# Patient Record
Sex: MALE | Race: WHITE | NOT HISPANIC OR LATINO | Employment: UNEMPLOYED | URBAN - METROPOLITAN AREA
[De-identification: names, ages, dates, MRNs, and addresses within clinical notes are randomized per-mention and may not be internally consistent; named-entity substitution may affect disease eponyms.]

---

## 2022-02-05 ENCOUNTER — OFFICE VISIT (OUTPATIENT)
Dept: URGENT CARE | Facility: CLINIC | Age: 42
End: 2022-02-05
Payer: COMMERCIAL

## 2022-02-05 VITALS
HEIGHT: 74 IN | SYSTOLIC BLOOD PRESSURE: 137 MMHG | BODY MASS INDEX: 28.23 KG/M2 | WEIGHT: 220 LBS | RESPIRATION RATE: 18 BRPM | TEMPERATURE: 97 F | HEART RATE: 96 BPM | DIASTOLIC BLOOD PRESSURE: 86 MMHG | OXYGEN SATURATION: 98 %

## 2022-02-05 DIAGNOSIS — H93.13 TINNITUS OF BOTH EARS: ICD-10-CM

## 2022-02-05 DIAGNOSIS — R42 VERTIGO: Primary | ICD-10-CM

## 2022-02-05 PROCEDURE — G0382 LEV 3 HOSP TYPE B ED VISIT: HCPCS | Performed by: EMERGENCY MEDICINE

## 2022-02-05 RX ORDER — MECLIZINE HYDROCHLORIDE 25 MG/1
25 TABLET ORAL EVERY 8 HOURS SCHEDULED
Qty: 30 TABLET | Refills: 0 | Status: SHIPPED | OUTPATIENT
Start: 2022-02-05

## 2022-02-05 RX ORDER — AMOXICILLIN AND CLAVULANATE POTASSIUM 875; 125 MG/1; MG/1
TABLET, FILM COATED ORAL
COMMUNITY
Start: 2022-02-02

## 2022-02-05 NOTE — PROGRESS NOTES
[address]        NAME: Chetan Fu is a 39 y o  male  : 1980    MRN: [id number]  DATE: 2022  TIME: 9:09 AM    Assessment and Plan   Vertigo [R42]  1  Vertigo  meclizine (ANTIVERT) 25 mg tablet    Ambulatory Referral to Otolaryngology   2  Tinnitus of both ears  meclizine (ANTIVERT) 25 mg tablet    Ambulatory Referral to Otolaryngology         Patient Instructions     Patient Instructions   Vertigo   WHAT YOU NEED TO KNOW:   Vertigo is a condition that causes you to feel dizzy  You may feel that you or everything around you is moving or spinning  You may also feel like you are being pulled down or toward your side  DISCHARGE INSTRUCTIONS:   Return to the emergency department if:   · You have a headache and a stiff neck  · You have shaking chills and a fever  · You vomit over and over with no relief  · You have blood, pus, or fluid coming out of your ears  · You are confused  Contact your healthcare provider if:   · Your symptoms do not get better with treatment  · You have questions about your condition or care  Medicines:   · Medicine  may be given to help relieve your symptoms  · Take your medicine as directed  Contact your healthcare provider if you think your medicine is not helping or if you have side effects  Tell him or her if you are allergic to any medicine  Keep a list of the medicines, vitamins, and herbs you take  Include the amounts, and when and why you take them  Bring the list or the pill bottles to follow-up visits  Carry your medicine list with you in case of an emergency  Manage your symptoms:   · Do not drive , walk without help, or operate heavy machinery when you are dizzy  · Move slowly  when you move from one position to another position  Get up slowly from sitting or lying down  Sit or lie down right away if you feel dizzy  · Drink plenty of liquids  Liquids help prevent dehydration   Ask how much liquid to drink each day and which liquids are best for you  · Vestibular and balance rehabilitation therapy (VBRT)  is used to teach you exercises to improve your balance and strength  These exercises may help decrease your vertigo and improve your balance  Ask for more information about this therapy  Follow up with your doctor as directed:  Write down your questions so you remember to ask them during your visits  © Copyright [employer] 2021 Information is for End User's use only and may not be sold, redistributed or otherwise used for commercial purposes  All illustrations and images included in CareNotes® are the copyrighted property of A D A M , Inc  or [facility] Yang Ideabovenasra   The above information is an  only  It is not intended as medical advice for individual conditions or treatments  Talk to your doctor, nurse or pharmacist before following any medical regimen to see if it is safe and effective for you  Tinnitus   WHAT YOU NEED TO KNOW:   Tinnitus is when you hear ringing, clicking, buzzing, or hissing in one or both ears  You may also hear whistling, chirping, or pulsing  It may be soft or loud, and at a low or high pitch  Tinnitus that lasts for longer than 6 months is considered chronic  DISCHARGE INSTRUCTIONS:   Call 911 if:   · You feel like hurting yourself or others because of the constant noise  Contact your healthcare provider if:   · You have headaches  · You are tired and have trouble concentrating or remembering things  · You have more anxiety or stress than usual     · You have deep sadness or depression  · You have trouble falling asleep or staying asleep  · Your symptoms do not go away or they get worse  · You have questions or concerns about your condition or care  Manage tinnitus:   · Counseling  can help you learn ways to relax, decrease stress, and make your tinnitus less noticeable  · Cognitive behavioral therapy  helps you understand your condition   Your therapist will help you learn to cope with tinnitus  You may also learn new ways to relax and retrain your behavior to decrease your symptoms  · Sound therapy, such as white noise machines, may help cover your tinnitus with a pleasant sound  Sound therapy devices can help you fall asleep or help you relax  These devices can be worn in your ear or placed next to your bed at night  · Hearing aids or cochlear implants  may help if you have hearing loss  · Do not smoke  Nicotine decreases blood flow to your ear and can make your tinnitus worse  Do not use e-cigarettes or smokeless tobacco in place of cigarettes or to help you quit  They still contain nicotine  Ask your healthcare provider for information if you currently smoke and need help quitting  · Decrease how much alcohol and caffeine you drink  Alcohol and caffeine can make your tinnitus worse  Prevent tinnitus:   · Avoid exposure to loud noise, such as loud music or power tools  Occasional exposure can still cause tinnitus  Move away from the noise or turn down the volume  · Wear ear protection  when you are exposed to loud noises  Good ear protection includes ear plugs or headphones that reduce noise  Follow up with your healthcare provider in 1 to 2 months:  Your healthcare provider may refer you to an otolaryngologist, audiologist, or neurologist  Steve Newell may need to return for regular follow-up visits  Write your questions down so you remember to ask them during your visits  © Copyright [employer] 2021 Information is for End User's use only and may not be sold, redistributed or otherwise used for commercial purposes  All illustrations and images included in CareNotes® are the copyrighted property of A D A M , Inc  or [facility] Yang Smith   The above information is an  only  It is not intended as medical advice for individual conditions or treatments   Talk to your doctor, nurse or pharmacist before following any medical regimen to see if it is safe and effective for you  Follow up with PCP in 3-5 days  Proceed to  ER if symptoms worsen  Chief Complaint     Chief Complaint   Patient presents with    Tinnitus     Started this morning very loud ringing in left ear and muffled hearing pt states voices sound robotic  Pt is also very dizzy and lightheaded  History of Present Illness       Patient complains of ringing in both ears associated with vertigo and nausea since this morning  Patient has a history of chronic perforation of right ear drum for which she has seen an ENT doctors but [place]  He recently completed a course of Augmentin for sinusitis  Review of Systems   Review of Systems   Constitutional: Negative for chills and fever  HENT: Positive for tinnitus  Negative for congestion, ear discharge, hearing loss, rhinorrhea and sinus pressure  Respiratory: Negative for cough, chest tightness, shortness of breath and wheezing  Gastrointestinal: Negative for diarrhea and vomiting  Musculoskeletal: Negative for neck stiffness  Skin: Negative for pallor  Neurological: Positive for dizziness  Negative for headaches           Current Medications       Current Outpatient Medications:     amoxicillin-clavulanate (AUGMENTIN) 875-125 mg per tablet, TAKE 1 TAB(S) ORALLY EVERY 12 HOURS X 10 DAYS, Disp: , Rfl:     meclizine (ANTIVERT) 25 mg tablet, Take 1 tablet (25 mg total) by mouth every 8 (eight) hours, Disp: 30 tablet, Rfl: 0    Current Allergies     Allergies as of 02/05/2022 - Reviewed 02/05/2022   Allergen Reaction Noted    Jean [cefaclor]  01/12/2016            The following portions of the patient's history were reviewed and updated as appropriate: allergies, current medications, past family history, past medical history, past social history, past surgical history and problem list      Past Medical History:   Diagnosis Date    Seizures ([facility] [address] ) Past Surgical History:   Procedure Laterality Date    EAR SURGERY      Repaired ear drum left ear     ROTATOR CUFF REPAIR      TYMPANOSTOMY TUBE PLACEMENT         History reviewed  No pertinent family history  Medications have been verified  Objective   /86   Pulse 96   Temp (!) 97 °F (36 1 °C)   Resp 18   Ht 6' 2" (1 88 m)   Wt 99 8 kg (220 lb)   SpO2 98%   BMI 28 25 kg/m²        Physical Exam     Physical Exam  Vitals and nursing note reviewed  Constitutional:       General: He is not in acute distress  Appearance: He is well-developed  He is not diaphoretic  HENT:      Head: Normocephalic and atraumatic  Comments: Scarring of left TM, central perforation of right TM that appears chronic     Nose: Mucosal edema present  No rhinorrhea  Mouth/Throat:      Pharynx: No posterior oropharyngeal erythema  Eyes:      Conjunctiva/sclera: Conjunctivae normal       Pupils: Pupils are equal, round, and reactive to light  Cardiovascular:      Rate and Rhythm: Normal rate and regular rhythm  Pulmonary:      Effort: Pulmonary effort is normal  No respiratory distress  Breath sounds: Normal breath sounds  Musculoskeletal:      Cervical back: Neck supple  Lymphadenopathy:      Cervical: No cervical adenopathy  Skin:     General: Skin is warm and dry  Coloration: Skin is not pale  Neurological:      Mental Status: He is alert and oriented to person, place, and time  Psychiatric:         Mood and Affect: Mood normal          Behavior: Behavior normal          Thought Content:  Thought content normal          Judgment: Judgment normal

## 2022-02-09 ENCOUNTER — OFFICE VISIT (OUTPATIENT)
Dept: PHYSICAL THERAPY | Facility: CLINIC | Age: 42
End: 2022-02-09
Payer: COMMERCIAL

## 2022-02-09 DIAGNOSIS — R42 DIZZINESS AND GIDDINESS: Primary | ICD-10-CM

## 2022-02-09 DIAGNOSIS — R42 VERTIGO: ICD-10-CM

## 2022-02-09 PROCEDURE — 97162 PT EVAL MOD COMPLEX 30 MIN: CPT | Performed by: PHYSICAL THERAPIST

## 2022-02-09 NOTE — PROGRESS NOTES
PT Evaluation  and PT Discharge    Today's date: 2022  Patient name: Quyen Santos  : 1980  MRN: [id number]  Referring provider: Freada Lennox, MELA  Dx:   Encounter Diagnosis     ICD-10-CM    1  Dizziness and giddiness  R42    2  Vertigo  R42                   Assessment  Assessment details: Quyen Santos is a pleasant 39 y o  male presenting to PT via [facility] with cc of dizziness and aural fullness for past 5 days  He notes sensation of tinnitus and hearing changes in L ear insidiously  He visited [facility] and received antibiotics and meclizine and was advised to f/u with PT if dizziness didn't improve  He notes that this morning his dizziness is better but aural fullness and auditory changes remain  Upon examination, patient is not a candidate for PT as impairments appear outside scope of PT  Will refer to ENT  Plan  Plan details: Will refer to ENT due to ss of infectious process  Pt  Can f/u after if experiencing ongoing dizziness  Subjective Evaluation    History of Present Illness  Mechanism of injury: Quyen Santos presents to PT with cc of improving dizziness symptoms associated with L ear aural fullness and tinnitus  He describes a steady worsening of a full fulling as well as sensntivity to noises followed by slowed decrease in auditory input  He visited [facility] and was told to see PT if dizziness doesn't improve  He states dizziness seemed to get better since making appointment and his primary issue is now the ringing in his ear  Treatments  Current treatment: medication  Patient Goals  Patient goals for therapy: improved balance          Objective     Concurrent Complaints  Positive for tinnitus, hearing loss, aural fullness and poor concentration  Negative for nausea/motion sickness, visual change, memory loss and peripheral neuropathy  Neuro Exam:     Exacerbating factors  Positive for bending over, rolling in bed and supine to/from sitting     Negative for looking up, walking, turning head (speed dependent), optokinetic movement and walking in busy environment  Positional testing   Placido-Hallpike   Left posterior canal: WNL  Right posterior canal: WNL  Roll test   Right horizontal canal: WNL  Positional testing comment: CORAZON testing shows reduced ability to balance on SLS and TS with EC    Cranial Nerve Screen:     CN II: The patient's visual acuity and visual fields are normal   CN III, IV, VI: The patient's pupils are equally round and reactive to light and ocular movements are normal   CN V: The patient has normal facial sensation  CN VII:  The patient has symmetric facial movement  CN VIII:  The patient's hearing is not normal *reduced input on Left noted  CN IX, X: The patient has symmetric palate movement and normal gag reflex  CN XI: The patient's shoulder shrug strength is normal   CN XII: The patient's tongue is midline without atrophy or fasciculations                   Precautions: Not PT candidate     Daily Treatment Diary:      Initial Evaluation Date: 02/09/22  Compliance 2/9                     Visit Number                     Re-Eval  IE                 [facility]   Foto Captured                            2/9                     Manual                      DC                                                                  Ther-Ex                                                                                                                                                                                                                                                  Neuro Re-Ed                                                                                                Ther-Act                                                               Modalities

## 2022-05-25 ENCOUNTER — OFFICE VISIT (OUTPATIENT)
Dept: URGENT CARE | Facility: CLINIC | Age: 42
End: 2022-05-25
Payer: COMMERCIAL

## 2022-05-25 VITALS
HEIGHT: 73 IN | DIASTOLIC BLOOD PRESSURE: 69 MMHG | BODY MASS INDEX: 29.16 KG/M2 | WEIGHT: 220 LBS | HEART RATE: 119 BPM | TEMPERATURE: 101.3 F | SYSTOLIC BLOOD PRESSURE: 130 MMHG | OXYGEN SATURATION: 97 %

## 2022-05-25 DIAGNOSIS — R35.0 FREQUENCY OF MICTURITION: Primary | ICD-10-CM

## 2022-05-25 LAB
SL AMB  POCT GLUCOSE, UA: NEGATIVE
SL AMB LEUKOCYTE ESTERASE,UA: ABNORMAL
SL AMB POCT BILIRUBIN,UA: NEGATIVE
SL AMB POCT BLOOD,UA: NEGATIVE
SL AMB POCT CLARITY,UA: ABNORMAL
SL AMB POCT COLOR,UA: YELLOW
SL AMB POCT KETONES,UA: ABNORMAL
SL AMB POCT NITRITE,UA: NEGATIVE
SL AMB POCT PH,UA: 6
SL AMB POCT SPECIFIC GRAVITY,UA: 1.01
SL AMB POCT URINE PROTEIN: ABNORMAL
SL AMB POCT UROBILINOGEN: NORMAL

## 2022-05-25 PROCEDURE — G0382 LEV 3 HOSP TYPE B ED VISIT: HCPCS | Performed by: PHYSICIAN ASSISTANT

## 2022-05-25 PROCEDURE — 87086 URINE CULTURE/COLONY COUNT: CPT | Performed by: PHYSICIAN ASSISTANT

## 2022-05-25 PROCEDURE — 81002 URINALYSIS NONAUTO W/O SCOPE: CPT | Performed by: PHYSICIAN ASSISTANT

## 2022-05-25 RX ORDER — CIPROFLOXACIN 500 MG/1
500 TABLET, FILM COATED ORAL EVERY 12 HOURS SCHEDULED
Qty: 14 TABLET | Refills: 0 | Status: SHIPPED | OUTPATIENT
Start: 2022-05-25 | End: 2022-05-27 | Stop reason: SDUPTHER

## 2022-05-25 RX ORDER — DIPHENOXYLATE HYDROCHLORIDE AND ATROPINE SULFATE 2.5; .025 MG/1; MG/1
1 TABLET ORAL EVERY MORNING
COMMUNITY

## 2022-05-25 NOTE — PATIENT INSTRUCTIONS
PC to patient to discuss adding Actos to DM medication. Message left on VM to call back.    Urinary Tract Infection in Men   WHAT YOU NEED TO KNOW:   A urinary tract infection (UTI) is caused by bacteria that get inside your urinary tract  Most bacteria that enter your urinary tract come out when you urinate  If the bacteria stay in your urinary tract, you may get an infection  Your urinary tract includes your kidneys, ureters, bladder, and urethra  Urine is made in your kidneys, and it flows from the ureters to the bladder  Urine leaves the bladder through the urethra  A UTI is more common in your lower urinary tract, which includes your bladder and urethra  DISCHARGE INSTRUCTIONS:   Return to the emergency department if:   You are urinating very little or not at all  You have a high fever with shaking chills  You have side or back pain that gets worse  Contact your healthcare provider if:   You have a mild fever  You do not feel better after 2 days of taking antibiotics  You are vomiting  You have new symptoms, such as blood or pus in your urine  You have questions or concerns about your condition or care  Medicines:   Antibiotics  help fight a bacterial infection  Medicines  may be given to decrease pain and burning when you urinate  They will also help decrease the feeling that you need to urinate often  These medicines will make your urine orange or red  Take your medicine as directed  Contact your healthcare provider if you think your medicine is not helping or if you have side effects  Tell him of her if you are allergic to any medicine  Keep a list of the medicines, vitamins, and herbs you take  Include the amounts, and when and why you take them  Bring the list or the pill bottles to follow-up visits  Carry your medicine list with you in case of an emergency  Prevent another UTI:   Empty your bladder often  Urinate and empty your bladder as soon as you feel the need  Do not hold your urine for long periods of time  Drink liquids as directed    Ask how much liquid to drink each day and which liquids are best for you  You may need to drink more liquids than usual to help flush out the bacteria  Do not drink alcohol, caffeine, or citrus juices  These can irritate your bladder and increase your symptoms  Your healthcare provider may recommend cranberry juice to help prevent a UTI  Urinate after you have sex  This can help flush out bacteria passed during sex  Do pelvic muscle exercises often  Pelvic muscle exercises may help you start and stop urinating  Strong pelvic muscles may help you empty your bladder easier  Squeeze these muscles tightly for 5 seconds like you are trying to hold back urine  Then relax for 5 seconds  Gradually work up to squeezing for 10 seconds  Do 3 sets of 15 repetitions a day, or as directed  Follow up with your doctor as directed:  Write down your questions so you remember to ask them during your visits  © Copyright [employer] 2022 Information is for End User's use only and may not be sold, redistributed or otherwise used for commercial purposes  All illustrations and images included in CareNotes® are the copyrighted property of A [employer] M , Inc  or [facility] Yang Smith   The above information is an  only  It is not intended as medical advice for individual conditions or treatments  Talk to your doctor, nurse or pharmacist before following any medical regimen to see if it is safe and effective for you

## 2022-05-27 DIAGNOSIS — N41.0 ACUTE PROSTATITIS: Primary | ICD-10-CM

## 2022-05-27 DIAGNOSIS — R35.0 FREQUENCY OF MICTURITION: ICD-10-CM

## 2022-05-27 LAB — BACTERIA UR CULT: NORMAL

## 2022-05-27 RX ORDER — CIPROFLOXACIN 500 MG/1
500 TABLET, FILM COATED ORAL EVERY 12 HOURS SCHEDULED
Qty: 14 TABLET | Refills: 0 | Status: SHIPPED | OUTPATIENT
Start: 2022-05-27 | End: 2022-06-03

## 2022-05-27 NOTE — PROGRESS NOTES
[address]        NAME: Hammad Sena is a 39 y o  male  : 1980    MRN: [id number]  DATE: May 25, 2022  TIME: 6:39 PM    Assessment and Plan   Frequency of micturition [R35 0]  1  Frequency of micturition  POCT urine dip    Urine culture    DISCONTINUED: ciprofloxacin (CIPRO) 500 mg tablet         Patient Instructions     Patient has fever and recent onset of urinary frequency after having intercourse with his wife in the hot tub  Urine dip shows moderate leukocytes and trace protein  Will send out urine culture to further evaluate and start him in the interim on Cipro  Recommended increased hydration, rest, close observation  Follow up with PCP in 3-5 days  Proceed to  ER if symptoms worsen  Chief Complaint     Chief Complaint   Patient presents with    Possible UTI     C/o fever, frequent urination, "bladder sore"onset today         History of Present Illness       Patient presents with onset today of fever, urinary frequency, soreness in his bladder  He reports that this was proceeded by him and his wife having sexual intercourse and there hot tub  He denies any discharge, burning during urination, flank pain, nausea or vomiting, hematuria  He denies previous history of UTI, STDs, or prostatitis  Review of Systems   Review of Systems   Constitutional: Positive for fever  Respiratory: Negative  Cardiovascular: Negative  Gastrointestinal: Negative  Genitourinary: Positive for frequency  Negative for difficulty urinating, dysuria, hematuria and penile discharge          Bladder discomfort         Current Medications       Current Outpatient Medications:     meclizine (ANTIVERT) 25 mg tablet, Take 1 tablet (25 mg total) by mouth every 8 (eight) hours, Disp: 30 tablet, Rfl: 0    amoxicillin-clavulanate (AUGMENTIN) 875-125 mg per tablet, TAKE 1 TAB(S) ORALLY EVERY 12 HOURS X 10 DAYS (Patient not taking: Reported on 2022), Disp: , Rfl:     ciprofloxacin (CIPRO) 500 mg tablet, Take 1 tablet (500 mg total) by mouth every 12 (twelve) hours for 7 days, Disp: 14 tablet, Rfl: 0    multivitamin (THERAGRAN) TABS, Take 1 tablet by mouth every morning, Disp: , Rfl:     Current Allergies     Allergies as of 05/25/2022 - Reviewed 05/25/2022   Allergen Reaction Noted    Ceclor [cefaclor]  01/12/2016            The following portions of the patient's history were reviewed and updated as appropriate: allergies, current medications, past family history, past medical history, past social history, past surgical history and problem list      Past Medical History:   Diagnosis Date    Depression     PTSD (post-traumatic stress disorder)     Seizures ([address] )        Past Surgical History:   Procedure Laterality Date    ADENOIDECTOMY      EAR SURGERY      Repaired ear drum left ear     ROTATOR CUFF REPAIR      TONSILLECTOMY      TYMPANOSTOMY TUBE PLACEMENT         History reviewed  No pertinent family history  Medications have been verified  Objective   /69   Pulse (!) 119   Temp (!) 101 3 °F (38 5 °C)   Ht 6' 1" (1 854 m)   Wt 99 8 kg (220 lb)   SpO2 97%   BMI 29 03 kg/m²   No LMP for male patient  Physical Exam     Physical Exam  Vitals reviewed  Constitutional:       General: He is not in acute distress  Appearance: He is well-developed  HENT:      Mouth/Throat:      Mouth: Mucous membranes are moist       Pharynx: Oropharynx is clear  Cardiovascular:      Rate and Rhythm: Normal rate and regular rhythm  Heart sounds: Normal heart sounds  No murmur heard  Pulmonary:      Effort: Pulmonary effort is normal  No respiratory distress  Breath sounds: Normal breath sounds  Abdominal:      Tenderness: There is no right CVA tenderness or left CVA tenderness  Neurological:      Mental Status: He is alert and oriented to person, place, and time

## 2023-05-07 ENCOUNTER — OFFICE VISIT (OUTPATIENT)
Dept: URGENT CARE | Facility: CLINIC | Age: 43
End: 2023-05-07

## 2023-05-07 VITALS
HEIGHT: 74 IN | SYSTOLIC BLOOD PRESSURE: 127 MMHG | WEIGHT: 197 LBS | DIASTOLIC BLOOD PRESSURE: 80 MMHG | HEART RATE: 92 BPM | TEMPERATURE: 98.2 F | RESPIRATION RATE: 18 BRPM | OXYGEN SATURATION: 100 % | BODY MASS INDEX: 25.28 KG/M2

## 2023-05-07 DIAGNOSIS — H60.501 ACUTE OTITIS EXTERNA OF RIGHT EAR, UNSPECIFIED TYPE: ICD-10-CM

## 2023-05-07 DIAGNOSIS — H72.91 PERFORATION OF RIGHT TYMPANIC MEMBRANE: Primary | ICD-10-CM

## 2023-05-07 RX ORDER — OFLOXACIN 3 MG/ML
10 SOLUTION AURICULAR (OTIC) DAILY
Qty: 10 ML | Refills: 0 | Status: SHIPPED | OUTPATIENT
Start: 2023-05-07 | End: 2023-05-07

## 2023-05-07 RX ORDER — OFLOXACIN 3 MG/ML
10 SOLUTION AURICULAR (OTIC) DAILY
Qty: 10 ML | Refills: 0 | Status: SHIPPED | OUTPATIENT
Start: 2023-05-07

## 2023-05-07 NOTE — PATIENT INSTRUCTIONS
Use Ofloxacin as prescribed  Avoid Q-Tip use  Tylenol/Ibuprofen for discomfort  Fluids  Change pillowcase daily  Follow up with PCP in 3-5 days    Proceed to ER if symptoms worsen

## 2023-05-07 NOTE — PROGRESS NOTES
[facility] [employer] [facility]        NAME: Thomas Taylor is a 43 y o  male  : 1980    MRN: [id number]  DATE: May 7, 2023  TIME: 10:26 AM    Assessment and Plan   Perforation of right tympanic membrane [H72 91]  1  Perforation of right tympanic membrane  Ambulatory Referral to Otolaryngology      2  Acute otitis externa of right ear, unspecified type  Ambulatory Referral to Otolaryngology    ofloxacin (FLOXIN) 0 3 % otic solution    DISCONTINUED: ofloxacin (FLOXIN) 0 3 % otic solution            Patient Instructions     Use Ofloxacin as prescribed  Avoid Q-Tip use  Tylenol/Ibuprofen for discomfort  Fluids  Change pillowcase daily  Follow up with PCP in 3-5 days  Proceed to ER if symptoms worsen    Chief Complaint     Chief Complaint   Patient presents with   • Earache     Pt reports swimming in a hot tub lastnight and got water in his R ear, c/o pain in the R ear since lastnight         History of Present Illness       Patient is a 44 yo male with significant PMH of chronic right TM perf presenting in the clinic today for ear pain x 1 day  Patient states he was in a hot tub yesterday when water was accidentally splashed into his right ear  Admits right ear tinnitus, right ear pain, and decrease in hearing of the right ear  Denies ear discharge, fever, chills, visual changes, headache, chest pain, and SOB  Denies the use of OTC treatment for symptom management  Patient notes he is new to the area and has not been closely followed by an ENT  Review of Systems   Review of Systems   Constitutional: Negative for chills and fever  HENT: Positive for ear pain, hearing loss and tinnitus  Negative for congestion, ear discharge and rhinorrhea  Eyes: Negative for visual disturbance  Respiratory: Negative for shortness of breath  Cardiovascular: Negative for chest pain  Skin: Negative for rash and wound  Neurological: Negative for dizziness, light-headedness and headaches           Current Medications "      Current Outpatient Medications:   •  meclizine (ANTIVERT) 25 mg tablet, Take 1 tablet (25 mg total) by mouth every 8 (eight) hours, Disp: 30 tablet, Rfl: 0  •  ofloxacin (FLOXIN) 0 3 % otic solution, Administer 10 drops to the right ear daily, Disp: 10 mL, Rfl: 0  •  amoxicillin-clavulanate (AUGMENTIN) 875-125 mg per tablet, TAKE 1 TAB(S) ORALLY EVERY 12 HOURS X 10 DAYS (Patient not taking: Reported on 5/25/2022), Disp: , Rfl:   •  multivitamin (THERAGRAN) TABS, Take 1 tablet by mouth every morning (Patient not taking: Reported on 5/7/2023), Disp: , Rfl:     Current Allergies     Allergies as of 05/07/2023 - Reviewed 05/07/2023   Allergen Reaction Noted   • Ceclor [cefaclor]  01/12/2016            The following portions of the patient's history were reviewed and updated as appropriate: allergies, current medications, past family history, past medical history, past social history, past surgical history and problem list      Past Medical History:   Diagnosis Date   • Depression    • PTSD (post-traumatic stress disorder)    • Seizures ([address] )        Past Surgical History:   Procedure Laterality Date   • ADENOIDECTOMY     • EAR SURGERY      Repaired ear drum left ear    • ROTATOR CUFF REPAIR     • TONSILLECTOMY     • TYMPANOSTOMY TUBE PLACEMENT         History reviewed  No pertinent family history  Medications have been verified  Objective   /80   Pulse 92   Temp 98 2 °F (36 8 °C)   Resp 18   Ht 6' 2\" (1 88 m)   Wt 89 4 kg (197 lb)   SpO2 100%   BMI 25 29 kg/m²        Physical Exam     Physical Exam  Vitals reviewed  Constitutional:       General: He is not in acute distress  Appearance: Normal appearance  He is normal weight  He is not ill-appearing  HENT:      Head: Normocephalic  Right Ear: Tenderness present  No drainage  There is no impacted cerumen  Tympanic membrane is perforated        Left Ear: Tympanic membrane, ear canal and external ear normal  There is no impacted " cerumen  Nose: Nose normal       Mouth/Throat:      Mouth: Mucous membranes are moist    Eyes:      General:         Right eye: No discharge  Left eye: No discharge  Extraocular Movements: Extraocular movements intact  Conjunctiva/sclera: Conjunctivae normal       Pupils: Pupils are equal, round, and reactive to light  Cardiovascular:      Rate and Rhythm: Normal rate and regular rhythm  Pulses: Normal pulses  Heart sounds: Normal heart sounds  No murmur heard  No friction rub  No gallop  Pulmonary:      Effort: Pulmonary effort is normal       Breath sounds: Normal breath sounds  No wheezing, rhonchi or rales  Skin:     General: Skin is warm  Neurological:      Mental Status: He is alert     Psychiatric:         Mood and Affect: Mood normal          Behavior: Behavior normal

## 2023-05-12 ENCOUNTER — HOSPITAL ENCOUNTER (EMERGENCY)
Facility: HOSPITAL | Age: 43
Discharge: HOME | End: 2023-05-12
Attending: EMERGENCY MEDICINE | Admitting: EMERGENCY MEDICINE

## 2023-05-12 VITALS
OXYGEN SATURATION: 99 % | BODY MASS INDEX: 25.15 KG/M2 | TEMPERATURE: 98.9 F | RESPIRATION RATE: 18 BRPM | HEART RATE: 99 BPM | HEIGHT: 74 IN | DIASTOLIC BLOOD PRESSURE: 86 MMHG | SYSTOLIC BLOOD PRESSURE: 138 MMHG | WEIGHT: 196 LBS

## 2023-05-12 DIAGNOSIS — L73.9 FOLLICULITIS: Primary | ICD-10-CM

## 2023-05-12 RX ORDER — SULFAMETHOXAZOLE AND TRIMETHOPRIM 800; 160 MG/1; MG/1
1 TABLET ORAL 2 TIMES DAILY
Qty: 20 TABLET | Refills: 0 | Status: SHIPPED | OUTPATIENT
Start: 2023-05-12 | End: 2023-05-22

## 2023-05-12 RX ORDER — AMOXICILLIN AND CLAVULANATE POTASSIUM 875; 125 MG/1; MG/1
1 TABLET, FILM COATED ORAL ONCE
Status: COMPLETED | OUTPATIENT
Start: 2023-05-12 | End: 2023-05-12

## 2023-05-12 RX ORDER — SULFAMETHOXAZOLE AND TRIMETHOPRIM 800; 160 MG/1; MG/1
1 TABLET ORAL ONCE
Status: COMPLETED | OUTPATIENT
Start: 2023-05-12 | End: 2023-05-12

## 2023-05-12 RX ORDER — AMOXICILLIN AND CLAVULANATE POTASSIUM 875; 125 MG/1; MG/1
1 TABLET, FILM COATED ORAL EVERY 12 HOURS
Qty: 20 TABLET | Refills: 0 | Status: SHIPPED | OUTPATIENT
Start: 2023-05-12 | End: 2023-05-22

## 2023-05-12 RX ADMIN — SULFAMETHOXAZOLE AND TRIMETHOPRIM 1 TABLET: 800; 160 TABLET ORAL at 08:02

## 2023-05-12 RX ADMIN — AMOXICILLIN AND CLAVULANATE POTASSIUM 1 TABLET: 875; 125 TABLET, FILM COATED ORAL at 08:02

## 2023-05-12 NOTE — ED PROVIDER NOTES
History  No chief complaint on file  Patient recently started on antibiotic eardrops for ruptured eardrum in the right ear  Now with bumps in the axillas that started 4 days ago  Now increasing in number  Now on both sides and on both chest   Had low-grade fever today of 100  No nausea or vomiting or diarrhea  No chills  Not diabetic  Was recently in the hot tub last weekend  History provided by:  Patient   used: No    Rash  Location: Axilla and chest   Quality: painful, redness and swelling    Pain details:     Quality:  Aching    Severity:  Mild    Onset quality:  Gradual    Duration:  4 days    Timing:  Constant    Progression:  Worsening  Severity:  Mild  Onset quality:  Gradual  Duration:  4 days  Timing:  Constant  Progression:  Spreading  Chronicity:  New  Context: not chemical exposure, not eggs, not nuts, not pregnancy and not sick contacts    Relieved by:  Nothing  Worsened by:  Nothing  Ineffective treatments:  None tried  Associated symptoms: fever    Associated symptoms: no abdominal pain, no diarrhea, no headaches, no joint pain, no nausea, no periorbital edema, no shortness of breath, no sore throat, no throat swelling, no tongue swelling, not vomiting and not wheezing        Prior to Admission Medications   Prescriptions Last Dose Informant Patient Reported?  Taking?   amoxicillin-clavulanate (AUGMENTIN) 875-125 mg per tablet   Yes No   Sig: TAKE 1 TAB(S) ORALLY EVERY 12 HOURS X 10 DAYS   Patient not taking: Reported on 5/25/2022   meclizine (ANTIVERT) 25 mg tablet   No No   Sig: Take 1 tablet (25 mg total) by mouth every 8 (eight) hours   multivitamin (THERAGRAN) TABS   Yes No   Sig: Take 1 tablet by mouth every morning   Patient not taking: Reported on 5/7/2023   ofloxacin (FLOXIN) 0 3 % otic solution   No No   Sig: Administer 10 drops to the right ear daily      Facility-Administered Medications: None       Past Medical History:   Diagnosis Date   • Depression • PTSD (post-traumatic stress disorder)    • Seizures ([facility] [address] )        Past Surgical History:   Procedure Laterality Date   • ADENOIDECTOMY     • EAR SURGERY      Repaired ear drum left ear    • ROTATOR CUFF REPAIR     • TONSILLECTOMY     • TYMPANOSTOMY TUBE PLACEMENT         No family history on file  I have reviewed and agree with the history as documented  E-Cigarette/Vaping   • E-Cigarette Use Never User      E-Cigarette/Vaping Substances     Social History     Tobacco Use   • Smoking status: Former   • Smokeless tobacco: Never   Vaping Use   • Vaping Use: Never used   Substance Use Topics   • Alcohol use: Yes     Comment: social   • Drug use: No       Review of Systems   Constitutional: Positive for fever  Negative for chills  HENT: Negative for ear pain, hearing loss, sore throat, trouble swallowing and voice change  Eyes: Negative for pain and discharge  Respiratory: Negative for cough, shortness of breath and wheezing  Cardiovascular: Negative for chest pain and palpitations  Gastrointestinal: Negative for abdominal pain, blood in stool, constipation, diarrhea, nausea and vomiting  Genitourinary: Negative for dysuria, flank pain, frequency and hematuria  Musculoskeletal: Negative for arthralgias, joint swelling, neck pain and neck stiffness  Skin: Positive for rash  Negative for wound  Neurological: Negative for dizziness, seizures, syncope, facial asymmetry and headaches  Psychiatric/Behavioral: Negative for hallucinations, self-injury and suicidal ideas  All other systems reviewed and are negative  Physical Exam  Physical Exam  Vitals and nursing note reviewed  Constitutional:       General: He is not in acute distress  Appearance: He is well-developed  HENT:      Head: Normocephalic and atraumatic  Right Ear: External ear normal       Left Ear: External ear normal    Eyes:      General: No scleral icterus  Right eye: No discharge           Left eye: No discharge  Extraocular Movements: Extraocular movements intact  Conjunctiva/sclera: Conjunctivae normal    Cardiovascular:      Rate and Rhythm: Normal rate and regular rhythm  Heart sounds: Normal heart sounds  No murmur heard  Pulmonary:      Effort: Pulmonary effort is normal       Breath sounds: Normal breath sounds  No wheezing or rales  Abdominal:      General: Bowel sounds are normal  There is no distension  Palpations: Abdomen is soft  Tenderness: There is no abdominal tenderness  There is no guarding or rebound  Musculoskeletal:         General: No deformity  Normal range of motion  Cervical back: Normal range of motion and neck supple  Skin:     General: Skin is warm and dry  Findings: No rash  Comments: Firm red raised bumps in the axilla region on both sides although the right is worse than the left  There is a small red raised bump on both nipples  There is no fluctuance  There is no induration  Minimal warmth  Neurological:      General: No focal deficit present  Mental Status: He is alert and oriented to person, place, and time  Cranial Nerves: No cranial nerve deficit  Psychiatric:         Mood and Affect: Mood normal          Behavior: Behavior normal          Thought Content: Thought content normal          Judgment: Judgment normal          Vital Signs  ED Triage Vitals   Temp Pulse Resp BP SpO2   -- -- -- -- --      Temp src Heart Rate Source Patient Position - Orthostatic VS BP Location FiO2 (%)   -- -- -- -- --      Pain Score       --           There were no vitals filed for this visit        Visual Acuity      ED Medications  Medications   amoxicillin-clavulanate (AUGMENTIN) 875-125 mg per tablet 1 tablet (has no administration in time range)   sulfamethoxazole-trimethoprim (BACTRIM DS) 800-160 mg per tablet 1 tablet (has no administration in time range)       Diagnostic Studies  Results Reviewed     None                 No orders to display              Procedures  Procedures         ED Course  ED Course as of 05/12/23 0757   Fri May 12, 2023   2505 Patient states he can take penicillin without difficulty  1355 On exam the patient looks like he has folliculitis  No definite areas of fluctuance  No abscesses noted  Patient was told to place on warm soaks  We will start antibiotics  Doubt zoster as the rash is bilateral   There is no tongue swelling  No hives  Doubt allergic component  MDM    Disposition  Final diagnoses: Folliculitis     Time reflects when diagnosis was documented in both MDM as applicable and the Disposition within this note     Time User Action Codes Description Comment    5/12/2023  7:53 AM Landon Steinberg Add [I97 1] Folliculitis       ED Disposition     ED Disposition   Discharge    Condition   Stable    Date/Time   Fri May 12, 2023  7:53 AM    Comment   Kp Maldonado discharge to home/self care  Follow-up Information    None         Patient's Medications   Discharge Prescriptions    AMOXICILLIN-CLAVULANATE (AUGMENTIN) 875-125 MG PER TABLET    Take 1 tablet by mouth every 12 (twelve) hours for 10 days       Start Date: 5/12/2023 End Date: 5/22/2023       Order Dose: 1 tablet       Quantity: 20 tablet    Refills: 0    SULFAMETHOXAZOLE-TRIMETHOPRIM (BACTRIM DS) 800-160 MG PER TABLET    Take 1 tablet by mouth 2 (two) times a day for 10 days smx-tmp DS (BACTRIM) 800-160 mg tabs (1tab q12 D10)       Start Date: 5/12/2023 End Date: 5/22/2023       Order Dose: 1 tablet       Quantity: 20 tablet    Refills: 0       No discharge procedures on file      PDMP Review     None          ED Provider  Electronically Signed by           Janice Springer MD  05/12/23 2837

## 2025-04-29 ENCOUNTER — HOSPITAL ENCOUNTER (EMERGENCY)
Facility: HOSPITAL | Age: 45
Discharge: HOME | End: 2025-04-29
Attending: EMERGENCY MEDICINE | Admitting: EMERGENCY MEDICINE
Payer: COMMERCIAL

## 2025-04-29 ENCOUNTER — APPOINTMENT (EMERGENCY)
Dept: RADIOLOGY | Facility: HOSPITAL | Age: 45
End: 2025-04-29
Payer: COMMERCIAL

## 2025-04-29 VITALS
DIASTOLIC BLOOD PRESSURE: 64 MMHG | SYSTOLIC BLOOD PRESSURE: 115 MMHG | WEIGHT: 204.37 LBS | HEART RATE: 55 BPM | RESPIRATION RATE: 16 BRPM | OXYGEN SATURATION: 96 % | HEIGHT: 74 IN | TEMPERATURE: 97.6 F | BODY MASS INDEX: 26.23 KG/M2

## 2025-04-29 DIAGNOSIS — R20.2 PARESTHESIAS: Primary | ICD-10-CM

## 2025-04-29 LAB
ALBUMIN SERPL BCG-MCNC: 4.9 G/DL (ref 3.5–5)
ALP SERPL-CCNC: 61 U/L (ref 34–104)
ALT SERPL W P-5'-P-CCNC: 22 U/L (ref 7–52)
ANION GAP SERPL CALCULATED.3IONS-SCNC: 8 MMOL/L (ref 4–13)
APTT PPP: 31 SECONDS (ref 23–34)
AST SERPL W P-5'-P-CCNC: 16 U/L (ref 13–39)
BASOPHILS # BLD AUTO: 0.04 THOUSANDS/ÂΜL (ref 0–0.1)
BASOPHILS NFR BLD AUTO: 1 % (ref 0–1)
BILIRUB SERPL-MCNC: 0.56 MG/DL (ref 0.2–1)
BUN SERPL-MCNC: 15 MG/DL (ref 5–25)
CALCIUM SERPL-MCNC: 9.9 MG/DL (ref 8.4–10.2)
CARDIAC TROPONIN I PNL SERPL HS: <2 NG/L (ref ?–50)
CHLORIDE SERPL-SCNC: 104 MMOL/L (ref 96–108)
CO2 SERPL-SCNC: 25 MMOL/L (ref 21–32)
CREAT SERPL-MCNC: 1.04 MG/DL (ref 0.6–1.3)
EOSINOPHIL # BLD AUTO: 0.05 THOUSAND/ÂΜL (ref 0–0.61)
EOSINOPHIL NFR BLD AUTO: 1 % (ref 0–6)
ERYTHROCYTE [DISTWIDTH] IN BLOOD BY AUTOMATED COUNT: 12.6 % (ref 11.6–15.1)
GFR SERPL CREATININE-BSD FRML MDRD: 86 ML/MIN/1.73SQ M
GLUCOSE SERPL-MCNC: 102 MG/DL (ref 65–140)
GLUCOSE SERPL-MCNC: 112 MG/DL (ref 65–140)
HCT VFR BLD AUTO: 41.1 % (ref 36.5–49.3)
HGB BLD-MCNC: 13.9 G/DL (ref 12–17)
IMM GRANULOCYTES # BLD AUTO: 0.02 THOUSAND/UL (ref 0–0.2)
IMM GRANULOCYTES NFR BLD AUTO: 0 % (ref 0–2)
INR PPP: 0.96 (ref 0.85–1.19)
LYMPHOCYTES # BLD AUTO: 1.17 THOUSANDS/ÂΜL (ref 0.6–4.47)
LYMPHOCYTES NFR BLD AUTO: 21 % (ref 14–44)
MAGNESIUM SERPL-MCNC: 2 MG/DL (ref 1.9–2.7)
MCH RBC QN AUTO: 29.3 PG (ref 26.8–34.3)
MCHC RBC AUTO-ENTMCNC: 33.8 G/DL (ref 31.4–37.4)
MCV RBC AUTO: 87 FL (ref 82–98)
MONOCYTES # BLD AUTO: 0.4 THOUSAND/ÂΜL (ref 0.17–1.22)
MONOCYTES NFR BLD AUTO: 7 % (ref 4–12)
NEUTROPHILS # BLD AUTO: 3.78 THOUSANDS/ÂΜL (ref 1.85–7.62)
NEUTS SEG NFR BLD AUTO: 70 % (ref 43–75)
NRBC BLD AUTO-RTO: 0 /100 WBCS
PLATELET # BLD AUTO: 325 THOUSANDS/UL (ref 149–390)
PMV BLD AUTO: 9.9 FL (ref 8.9–12.7)
POTASSIUM SERPL-SCNC: 3.5 MMOL/L (ref 3.5–5.3)
PROT SERPL-MCNC: 8.2 G/DL (ref 6.4–8.4)
PROTHROMBIN TIME: 13.2 SECONDS (ref 12.3–15)
RBC # BLD AUTO: 4.74 MILLION/UL (ref 3.88–5.62)
SODIUM SERPL-SCNC: 137 MMOL/L (ref 135–147)
WBC # BLD AUTO: 5.46 THOUSAND/UL (ref 4.31–10.16)

## 2025-04-29 PROCEDURE — 85730 THROMBOPLASTIN TIME PARTIAL: CPT | Performed by: EMERGENCY MEDICINE

## 2025-04-29 PROCEDURE — 82948 REAGENT STRIP/BLOOD GLUCOSE: CPT

## 2025-04-29 PROCEDURE — 96375 TX/PRO/DX INJ NEW DRUG ADDON: CPT

## 2025-04-29 PROCEDURE — 85025 COMPLETE CBC W/AUTO DIFF WBC: CPT | Performed by: EMERGENCY MEDICINE

## 2025-04-29 PROCEDURE — 83735 ASSAY OF MAGNESIUM: CPT | Performed by: EMERGENCY MEDICINE

## 2025-04-29 PROCEDURE — 96365 THER/PROPH/DIAG IV INF INIT: CPT

## 2025-04-29 PROCEDURE — 99284 EMERGENCY DEPT VISIT MOD MDM: CPT

## 2025-04-29 PROCEDURE — 84484 ASSAY OF TROPONIN QUANT: CPT | Performed by: EMERGENCY MEDICINE

## 2025-04-29 PROCEDURE — 85610 PROTHROMBIN TIME: CPT | Performed by: EMERGENCY MEDICINE

## 2025-04-29 PROCEDURE — 80053 COMPREHEN METABOLIC PANEL: CPT | Performed by: EMERGENCY MEDICINE

## 2025-04-29 PROCEDURE — 99284 EMERGENCY DEPT VISIT MOD MDM: CPT | Performed by: EMERGENCY MEDICINE

## 2025-04-29 PROCEDURE — 93005 ELECTROCARDIOGRAM TRACING: CPT

## 2025-04-29 PROCEDURE — 71045 X-RAY EXAM CHEST 1 VIEW: CPT

## 2025-04-29 PROCEDURE — 36415 COLL VENOUS BLD VENIPUNCTURE: CPT | Performed by: EMERGENCY MEDICINE

## 2025-04-29 RX ORDER — DIAZEPAM 5 MG/1
5-10 TABLET ORAL EVERY 6 HOURS PRN
Qty: 10 TABLET | Refills: 0 | Status: SHIPPED | OUTPATIENT
Start: 2025-04-29 | End: 2025-05-09

## 2025-04-29 RX ORDER — DROPERIDOL 2.5 MG/ML
1.25 INJECTION, SOLUTION INTRAMUSCULAR; INTRAVENOUS ONCE
Status: COMPLETED | OUTPATIENT
Start: 2025-04-29 | End: 2025-04-29

## 2025-04-29 RX ORDER — DIAZEPAM 5 MG/1
5 TABLET ORAL ONCE
Status: COMPLETED | OUTPATIENT
Start: 2025-04-29 | End: 2025-04-29

## 2025-04-29 RX ORDER — MAGNESIUM SULFATE HEPTAHYDRATE 40 MG/ML
2 INJECTION, SOLUTION INTRAVENOUS ONCE
Status: COMPLETED | OUTPATIENT
Start: 2025-04-29 | End: 2025-04-29

## 2025-04-29 RX ORDER — DIPHENHYDRAMINE HYDROCHLORIDE 50 MG/ML
25 INJECTION, SOLUTION INTRAMUSCULAR; INTRAVENOUS ONCE
Status: COMPLETED | OUTPATIENT
Start: 2025-04-29 | End: 2025-04-29

## 2025-04-29 RX ADMIN — DIAZEPAM 5 MG: 5 TABLET ORAL at 18:50

## 2025-04-29 RX ADMIN — DIPHENHYDRAMINE HYDROCHLORIDE 25 MG: 50 INJECTION, SOLUTION INTRAMUSCULAR; INTRAVENOUS at 17:41

## 2025-04-29 RX ADMIN — DROPERIDOL 1.25 MG: 2.5 INJECTION, SOLUTION INTRAMUSCULAR; INTRAVENOUS at 17:45

## 2025-04-29 RX ADMIN — MAGNESIUM SULFATE HEPTAHYDRATE 2 G: 2 INJECTION, SOLUTION INTRAVENOUS at 17:43

## 2025-04-29 NOTE — ED PROVIDER NOTES
Time reflects when diagnosis was documented in both MDM as applicable and the Disposition within this note       Time User Action Codes Description Comment    4/29/2025  6:50 PM Mauricio Watson Add [R20.2] Paresthesias           ED Disposition       ED Disposition   Discharge    Condition   Stable    Date/Time   Tue Apr 29, 2025  6:50 PM    Comment   Madhu Lopez discharge to home/self care.                   Assessment & Plan       Medical Decision Making  This patient presents with hot flash, upper extremity tingling, palpitations.   Diagnostic considerations include ACS, seizure, allergic reaction. See ED Course.       Amount and/or Complexity of Data Reviewed  Labs: ordered. Decision-making details documented in ED Course.  Radiology: ordered and independent interpretation performed. Decision-making details documented in ED Course.  ECG/medicine tests: ordered and independent interpretation performed. Decision-making details documented in ED Course.    Risk  Prescription drug management.        ED Course as of 04/29/25 1855 Tue Apr 29, 2025   1714 EKG 1707 p.m. normal sinus rhythm rate 81 normal axis normal intervals T wave inversion V1 PVC no ST elevation or depression interpreted by me   1843 hs TnI 0hr: <2   1843 MAGNESIUM: 2.0   1843 BUN: 15   1843 Creatinine: 1.04   1843 WBC: 5.46   1848 Patient describes recently starting Strattera prescribed by neuro clinician, will contact and inform of likely side effects and discontinue, agrees with short course of benzodiazepines and will return if worse or additional symptoms, remains stable for close outpatient f/u       Medications   magnesium sulfate 2 g/50 mL IVPB (premix) 2 g (0 g Intravenous Stopped 4/29/25 1845)   droperidol (INAPSINE) injection 1.25 mg (1.25 mg Intravenous Given 4/29/25 1745)   diphenhydrAMINE (BENADRYL) injection 25 mg (25 mg Intravenous Given 4/29/25 1741)   diazepam (VALIUM) tablet 5 mg (5 mg Oral Given 4/29/25 1850)       ED Risk  Strat Scores                    No data recorded                            History of Present Illness       Chief Complaint   Patient presents with    Dizziness     Pt presented to this ED from home c/o intermittent episodes dizziness w/diaphoresis and overheating today while standing working on computer. Pt mentioned starting new medication 3 days ago.       Past Medical History:   Diagnosis Date    Depression     PTSD (post-traumatic stress disorder)     Seizures ([facility])       Past Surgical History:   Procedure Laterality Date    ADENOIDECTOMY      EAR SURGERY      Repaired ear drum left ear     ROTATOR CUFF REPAIR      TONSILLECTOMY      TYMPANOSTOMY TUBE PLACEMENT        History reviewed. No pertinent family history.   Social History     Tobacco Use    Smoking status: Former    Smokeless tobacco: Never   Vaping Use    Vaping status: Every Day   Substance Use Topics    Alcohol use: Yes     Comment: social    Drug use: No      E-Cigarette/Vaping    E-Cigarette Use Current Every Day User       E-Cigarette/Vaping Substances      I have reviewed and agree with the history as documented.     44-year-old male describes 8-10 episodes of whole body heat flash with upper extremity tingling and palpitations occurring more frequently today.  No chest pain or dyspnea.  No hemoptysis.  No fever.  No prior episodes.  No history of ACS, CVA or VTE.      History provided by:  Patient  Dizziness  Quality:  Lightheadedness  Onset quality:  Sudden  Timing:  Intermittent  Progression:  Worsening  Context: not with physical activity    Relieved by:  None tried  Worsened by:  Nothing  Ineffective treatments:  None tried  Associated symptoms: palpitations    Associated symptoms: no chest pain and no headaches    Risk factors: no new medications        Review of Systems   Cardiovascular:  Positive for palpitations. Negative for chest pain.   Neurological:  Positive for dizziness. Negative for headaches.   All other systems reviewed and  are negative.          Objective       ED Triage Vitals [04/29/25 1656]   Temperature Pulse Blood Pressure Respirations SpO2 Patient Position - Orthostatic VS   97.6 °F (36.4 °C) 92 149/90 18 100 % Sitting      Temp src Heart Rate Source BP Location FiO2 (%) Pain Score    -- Monitor Left arm -- --      Vitals      Date and Time Temp Pulse SpO2 Resp BP Pain Score FACES Pain Rating User   04/29/25 1845 -- 60 97 % 15 119/72 -- --    04/29/25 1830 -- 60 100 % 12 -- -- --    04/29/25 1815 -- 69 100 % 20 -- -- --    04/29/25 1800 -- 101 100 % 15 -- -- --    04/29/25 1745 -- 76 100 % 20 -- -- --    04/29/25 1715 -- 85 100 % 13 140/87 -- --    04/29/25 1656 97.6 °F (36.4 °C) 92 100 % 18 149/90 -- -- AC            Physical Exam  Vitals and nursing note reviewed.   Constitutional:       General: He is not in acute distress.     Appearance: Normal appearance.      Comments: Pleasant, comfortable appearing, conversational, articulate   HENT:      Head: Normocephalic and atraumatic.      Right Ear: External ear normal.      Left Ear: External ear normal.      Nose: Nose normal.      Mouth/Throat:      Mouth: Mucous membranes are moist.   Eyes:      Extraocular Movements: Extraocular movements intact.      Pupils: Pupils are equal, round, and reactive to light.   Cardiovascular:      Rate and Rhythm: Normal rate and regular rhythm.      Heart sounds: No murmur heard.  Pulmonary:      Effort: Pulmonary effort is normal. No respiratory distress.      Breath sounds: Normal breath sounds. No wheezing or rales.   Abdominal:      General: Abdomen is flat. Bowel sounds are normal. There is no distension.      Tenderness: There is no abdominal tenderness. There is no guarding or rebound.   Musculoskeletal:      Cervical back: Neck supple.      Right lower leg: No edema.      Left lower leg: No edema.   Skin:     General: Skin is warm and dry.   Neurological:      General: No focal deficit present.      Mental Status: He is  alert and oriented to person, place, and time.      Cranial Nerves: No cranial nerve deficit.      Sensory: No sensory deficit.      Motor: No weakness.      Coordination: Coordination normal.   Psychiatric:         Mood and Affect: Mood normal.         Behavior: Behavior normal.         Results Reviewed       Procedure Component Value Units Date/Time    HS Troponin 0hr (reflex protocol) [[id number]]  (Normal) Collected: 04/29/25 1720    Lab Status: Final result Specimen: Blood from Arm, Left Updated: 04/29/25 1752     hs TnI 0hr <2 ng/L     HS Troponin I 2hr [[id number]]     Lab Status: No result Specimen: Blood     Comprehensive metabolic panel [[id number]] Collected: 04/29/25 1720    Lab Status: Final result Specimen: Blood from Arm, Left Updated: 04/29/25 1748     Sodium 137 mmol/L      Potassium 3.5 mmol/L      Chloride 104 mmol/L      CO2 25 mmol/L      ANION GAP 8 mmol/L      BUN 15 mg/dL      Creatinine 1.04 mg/dL      Glucose 112 mg/dL      Calcium 9.9 mg/dL      AST 16 U/L      ALT 22 U/L      Alkaline Phosphatase 61 U/L      Total Protein 8.2 g/dL      Albumin 4.9 g/dL      Total Bilirubin 0.56 mg/dL      eGFR 86 ml/min/1.73sq m     Narrative:      National Kidney Disease Foundation guidelines for Chronic Kidney Disease (CKD):     Stage 1 with normal or high GFR (GFR > 90 mL/min/1.73 square meters)    Stage 2 Mild CKD (GFR = 60-89 mL/min/1.73 square meters)    Stage 3A Moderate CKD (GFR = 45-59 mL/min/1.73 square meters)    Stage 3B Moderate CKD (GFR = 30-44 mL/min/1.73 square meters)    Stage 4 Severe CKD (GFR = 15-29 mL/min/1.73 square meters)    Stage 5 End Stage CKD (GFR <15 mL/min/1.73 square meters)  Note: GFR calculation is accurate only with a steady state creatinine    Magnesium [[id number]]  (Normal) Collected: 04/29/25 1720    Lab Status: Final result Specimen: Blood from Arm, Left Updated: 04/29/25 1748     Magnesium 2.0 mg/dL     Fingerstick Glucose (POCT) [[id number]]  (Normal) Collected:  04/29/25 1746    Lab Status: Final result Specimen: Blood Updated: 04/29/25 1747     POC Glucose 102 mg/dl     Protime-INR [[id number]]  (Normal) Collected: 04/29/25 1720    Lab Status: Final result Specimen: Blood from Arm, Left Updated: 04/29/25 1741     Protime 13.2 seconds      INR 0.96    Narrative:      INR Therapeutic Range    Indication                                             INR Range      Atrial Fibrillation                                               2.0-3.0  Hypercoagulable State                                    2.0.2.3  Left Ventricular Asist Device                            2.0-3.0  Mechanical Heart Valve                                  -    Aortic(with afib, MI, embolism, HF, LA enlargement,    and/or coagulopathy)                                     2.0-3.0 (2.5-3.5)     Mitral                                                             2.5-3.5  Prosthetic/Bioprosthetic Heart Valve               2.0-3.0  Venous thromboembolism (VTE: VT, PE        2.0-3.0    APTT [[id number]]  (Normal) Collected: 04/29/25 1720    Lab Status: Final result Specimen: Blood from Arm, Left Updated: 04/29/25 1741     PTT 31 seconds     CBC and differential [[id number]] Collected: 04/29/25 1720    Lab Status: Final result Specimen: Blood from Arm, Left Updated: 04/29/25 1727     WBC 5.46 Thousand/uL      RBC 4.74 Million/uL      Hemoglobin 13.9 g/dL      Hematocrit 41.1 %      MCV 87 fL      MCH 29.3 pg      MCHC 33.8 g/dL      RDW 12.6 %      MPV 9.9 fL      Platelets 325 Thousands/uL      nRBC 0 /100 WBCs      Segmented % 70 %      Immature Grans % 0 %      Lymphocytes % 21 %      Monocytes % 7 %      Eosinophils Relative 1 %      Basophils Relative 1 %      Absolute Neutrophils 3.78 Thousands/µL      Absolute Immature Grans 0.02 Thousand/uL      Absolute Lymphocytes 1.17 Thousands/µL      Absolute Monocytes 0.40 Thousand/µL      Eosinophils Absolute 0.05 Thousand/µL      Basophils Absolute 0.04 Thousands/µL              XR chest portable   Final Interpretation by Preston Sterling MD (04/29 1825)      No acute cardiopulmonary disease.            Workstation performed: [id number]             Procedures    ED Medication and Procedure Management   Prior to Admission Medications   Prescriptions Last Dose Informant Patient Reported? Taking?   amoxicillin-clavulanate (AUGMENTIN) 875-125 mg per tablet   Yes No   Sig: TAKE 1 TAB(S) ORALLY EVERY 12 HOURS X 10 DAYS   Patient not taking: Reported on 5/25/2022   meclizine (ANTIVERT) 25 mg tablet   No No   Sig: Take 1 tablet (25 mg total) by mouth every 8 (eight) hours   multivitamin (THERAGRAN) TABS   Yes No   Sig: Take 1 tablet by mouth every morning   Patient not taking: Reported on 5/7/2023   ofloxacin (FLOXIN) 0.3 % otic solution   No No   Sig: Administer 10 drops to the right ear daily      Facility-Administered Medications: None     Patient's Medications   Discharge Prescriptions    DIAZEPAM (VALIUM) 5 MG TABLET    Take 1-2 tablets (5-10 mg total) by mouth every 6 (six) hours as needed (paresthesias) for up to 10 days       Start Date: 4/29/2025 End Date: 5/9/2025       Order Dose: 5-10 mg       Quantity: 10 tablet    Refills: 0       ED SEPSIS DOCUMENTATION   Time reflects when diagnosis was documented in both MDM as applicable and the Disposition within this note       Time User Action Codes Description Comment    4/29/2025  6:50 PM Mauricio Watson Add [R20.2] Paresthesias                  Mauricio Watson DO  04/29/25 1565

## 2025-04-30 LAB
ATRIAL RATE: 100 BPM
P AXIS: 74 DEGREES
PR INTERVAL: 142 MS
QRS AXIS: 79 DEGREES
QRSD INTERVAL: 78 MS
QT INTERVAL: 384 MS
QTC INTERVAL: 446 MS
T WAVE AXIS: 61 DEGREES
VENTRICULAR RATE: 81 BPM